# Patient Record
Sex: MALE | Race: WHITE | Employment: OTHER
[De-identification: names, ages, dates, MRNs, and addresses within clinical notes are randomized per-mention and may not be internally consistent; named-entity substitution may affect disease eponyms.]

---

## 2017-06-09 ENCOUNTER — TELEPHONE (OUTPATIENT)
Dept: RADIATION ONCOLOGY | Facility: MEDICAL CENTER | Age: 82
End: 2017-06-09

## 2017-07-21 ENCOUNTER — TELEPHONE (OUTPATIENT)
Dept: RADIATION ONCOLOGY | Facility: MEDICAL CENTER | Age: 82
End: 2017-07-21

## 2017-12-12 ENCOUNTER — OFFICE VISIT (OUTPATIENT)
Dept: INFECTIOUS DISEASES | Age: 82
End: 2017-12-12
Payer: MEDICARE

## 2017-12-12 VITALS
HEIGHT: 64 IN | TEMPERATURE: 96.7 F | DIASTOLIC BLOOD PRESSURE: 99 MMHG | WEIGHT: 150 LBS | BODY MASS INDEX: 25.61 KG/M2 | OXYGEN SATURATION: 95 % | HEART RATE: 64 BPM | SYSTOLIC BLOOD PRESSURE: 139 MMHG

## 2017-12-12 DIAGNOSIS — B38.9 COCCIDIOMYCOSIS, PROGRESSIVE: Primary | ICD-10-CM

## 2017-12-12 PROCEDURE — 1123F ACP DISCUSS/DSCN MKR DOCD: CPT | Performed by: INTERNAL MEDICINE

## 2017-12-12 PROCEDURE — G8484 FLU IMMUNIZE NO ADMIN: HCPCS | Performed by: INTERNAL MEDICINE

## 2017-12-12 PROCEDURE — 4040F PNEUMOC VAC/ADMIN/RCVD: CPT | Performed by: INTERNAL MEDICINE

## 2017-12-12 PROCEDURE — G8419 CALC BMI OUT NRM PARAM NOF/U: HCPCS | Performed by: INTERNAL MEDICINE

## 2017-12-12 PROCEDURE — 99215 OFFICE O/P EST HI 40 MIN: CPT | Performed by: INTERNAL MEDICINE

## 2017-12-12 PROCEDURE — 1036F TOBACCO NON-USER: CPT | Performed by: INTERNAL MEDICINE

## 2017-12-12 PROCEDURE — G8427 DOCREV CUR MEDS BY ELIG CLIN: HCPCS | Performed by: INTERNAL MEDICINE

## 2017-12-12 RX ORDER — MAGNESIUM OXIDE 400 MG/1
400 TABLET ORAL DAILY
COMMUNITY

## 2017-12-12 RX ORDER — FLUCONAZOLE 200 MG/1
200 TABLET ORAL DAILY
COMMUNITY
End: 2018-04-24 | Stop reason: SDUPTHER

## 2017-12-12 RX ORDER — DILTIAZEM HYDROCHLORIDE 120 MG/1
120 TABLET, FILM COATED ORAL 4 TIMES DAILY
COMMUNITY

## 2017-12-12 NOTE — LETTER
The patient will have a repeat CT scan of the chest at the [facility] which we were able to review with him and his family. CT scan is stable and does not show any changes in the masslike lesion in the right upper lobe. There are no other new areas of involvement. The patient also was concerned about his ascending aorta aneurysm. The CT scan did not show any progression of this particular lesion. The patient was reassured on both accounts and advised to simply continue his suppressive doses of Diflucan. He had a question about being able to visit his family in [place] for short periods of time. 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 The patient indicated that he would return to see me after she came back from [place] sometime in April or May 2018. Pertinent review of systems include: No fevers no chills no weight loss. No pulmonary symptoms  For complete review of symptoms see ROS section below. 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 The patient has been on treatment with Diflucan and has shown no progression of the lesions since August 2015. Currently on suppressive doses with Diflucan at 200 mg per day. We will plan to maintain same therapy and follow-up with patient in April or May 2018. He also has an ascending aortic aneurysm, which measures about 5 cm in diameter, which has remained stable. Previous visits with Dr. Sheryle Friendly at [facility] and with a cardiovascular surgeon at the [address] [place] [address],Unit [facility] have yielded the advise for non- surgical conservative treatment. Discussed with patient, family.     I have personally reviewed the past medical history, past surgical history, medications, social history, and family history, and I have  Number of children: N/A    Years of education: N/A     Occupational History    Not on file. Social History Main Topics    Smoking status: Former Smoker    Smokeless tobacco: Never Used      Comment: quit 43 years ago    Alcohol use Yes    Drug use: No    Sexual activity: Not on file     Other Topics Concern    Not on file     Social History Narrative    No narrative on file       Family History:     Family History   Problem Relation Age of Onset    Heart Disease Father     Diabetes Father         Allergies:   Review of patient's allergies indicates no known allergies. Review of Systems:   Constitutional: No fevers or chills. No systemic complaints  Head: No headaches  Eyes: No double vision or blurry vision. ENT: No sore throat or runny nose. . No hearing loss, tinnitus or vertigo. Cardiovascular: No chest pain or palpitations. No shortness of breath. No ADAIR  Lung: No shortness of breath or cough. No sputum production  Abdomen: No nausea, vomiting, diarrhea, or abdominal pain. .  Genitourinary: No increased urinary frequency, or dysuria. No hematuria. No suprapubic or CVA pain  Musculoskeletal: No muscle aches or pains. No joint effusions, swelling or deformities  Hematologic: No bleeding or bruising. Neurologic: No headache, weakness, numbness, or tingling. Physical Examination :   BP (!) 139/99 (Site: Right Arm, Position: Sitting)   Pulse 64   Temp 96.7 °F (35.9 °C)   Ht 5' 4\" (1.626 m)   Wt 150 lb (68 kg)   SpO2 95%   BMI 25.75 kg/m²     General Appearance: Awake, alert, and in no apparent distress  Head:  Normocephalic, no trauma  Eyes: Pupils equal, round, reactive, to light and accommodation; extraocular movements intact; sclera anicteric; conjunctivae pink. No embolic phenomena. ENT: Oropharynx clear, without erythema, exudate, or thrush. No tenderness of sinuses. Mouth/throat: mucosa pink and moist. No lesions. Dentition in good repair. Neck:Supple, without lymphadenopathy. Thyroid normal, No bruits. Pulmonary/Chest: Clear to auscultation, without wheezes, rales, or rhonchi. No dullness to percussion. Cardiovascular: Regular rate and rhythm without murmurs, rubs, or gallops. Abdomen: Soft, non tender. Bowel sounds normal. No organomegaly  All four Extremities: No cyanosis, clubbing, edema, or effusions. Neurologic: No gross sensory or motor deficits. Skin: Warm and dry with good turgor. Signs of peripheral arterial insufficiency. Medical Decision Making:   I have independently reviewed/ordered the following labs:  Chest CT from [facility] was reviewed with the patient and his family. CBC with Differential: No results found for: WBC, HGB, HCT, PLT, SEGSPCT, BANDSPCT, LYMPHOPCT, MONOPCT, EOSPCT  BMP:   Lab Results   Component Value Date    CREATININE 2.1 02/01/2017     Hepatic Function Panel: No results found for: PROT, LABALBU, BILIDIR, IBILI, BILITOT, ALKPHOS, ALT, AST  No results found for: RPR  No results found for: HIV  No results found for: BC  No results found for: MUCUS, PH, RBC, TRICHOMONAS, WBC, YEAST, TURBIDITY  Lab Results   Component Value Date    CREATININE 2.1 02/01/2017       Thank you for allowing us to participate in the care of this patient. Please call with questions. Renee Washington MD  Pager: [phone] - Office: [phone]          If you have questions, please do not hesitate to call me. I look forward to following Jose De Luna along with you.     Sincerely,        Renee Washington MD

## 2017-12-12 NOTE — TELEPHONE ENCOUNTER
Patient was in for an appointment today and Dr. Zulema Savage came to me and asked me to place and pend and order for this patients Diflucan 200mg one tablet once a day for 6 month.

## 2017-12-13 RX ORDER — FLUCONAZOLE 200 MG/1
200 TABLET ORAL DAILY
Qty: 30 TABLET | Refills: 5 | Status: SHIPPED | OUTPATIENT
Start: 2017-12-13 | End: 2018-01-12

## 2017-12-13 NOTE — PROGRESS NOTES
[facility] - Office Progress Note  Today's Date and Time: 12/13/2017, 3:46 PM    Diagnostic Impression :     1. Coccidiomycosis, progressive        Recommendations   · Continue Suppressive treatment with Diflucan  · Follow-up in 4 months after he returns from [place] complaint/reason for consultation:     Chief Complaint   Patient presents with    Other     coccidiodomycosis       History of Present Illness:   Pineda Epsinosa is a 80y.o.-year-old  male who was evaluated on 12/12/2017. Patient seen at the request of Larisa Velazco. Patient came into the office as a follow-up for his problems with pulmonary coccidiomycosis. He indicates he has continued to do well and has not noticed any changes in his pulmonary status. The patient denied any cough,  hemoptysis or expectoration of sputum. She denied any changes in his degree of activity. He has been taking suppressive doses of Diflucan currently at 200 mg daily. The patient has been previously treated with higher doses of Diflucan but he could not tolerate 400 mg daily on a long-term basis. Consequently we elected to treat him with 200 mg per day feeling that this was a better than nothing. The patient will have a repeat CT scan of the chest at the [facility] which we were able to review with him and his family. CT scan is stable and does not show any changes in the masslike lesion in the right upper lobe. There are no other new areas of involvement. The patient also was concerned about his ascending aorta aneurysm. The CT scan did not show any progression of this particular lesion. The patient was reassured on both accounts and advised to simply continue his suppressive doses of Diflucan. He had a question about being able to visit his family in [place] for short periods of time.   I indicated to him that this should not be a problem since he already has developed immunity against the PLACEMENT  06/2010    PROSTATE SURGERY      TONSILLECTOMY      UPPER GASTROINTESTINAL ENDOSCOPY         Medications:     Current Outpatient Prescriptions:     diltiazem (CARDIZEM) 120 MG tablet, Take 120 mg by mouth 4 times daily, Disp: , Rfl:     fluconazole (DIFLUCAN) 200 MG tablet, Take 200 mg by mouth daily, Disp: , Rfl:     magnesium oxide (MAG-OX) 400 MG tablet, Take 400 mg by mouth daily, Disp: , Rfl:     rivaroxaban (XARELTO) 20 MG TABS tablet, Take 20 mg by mouth, Disp: , Rfl:     omeprazole (PRILOSEC) 20 MG delayed release capsule, Take 20 mg by mouth daily, Disp: , Rfl:     lisinopril (PRINIVIL;ZESTRIL) 2.5 MG tablet, Take 2.5 mg by mouth daily, Disp: , Rfl:     FLUoxetine (PROZAC) 20 MG capsule, Take 20 mg by mouth daily, Disp: , Rfl:     Multiple Vitamins-Minerals (THERAPEUTIC MULTIVITAMIN-MINERALS) tablet, Take 1 tablet by mouth daily, Disp: , Rfl:     Omega-3 Fatty Acids (FISH OIL) 1000 MG CAPS, Take 3,000 mg by mouth daily, Disp: , Rfl:     fluconazole (DIFLUCAN) 200 MG tablet, Take 1 tablet by mouth daily, Disp: 30 tablet, Rfl: 5     Social History:     Social History     Social History    Marital status:      Spouse name: N/A    Number of children: N/A    Years of education: N/A     Occupational History    Not on file. Social History Main Topics    Smoking status: Former Smoker    Smokeless tobacco: Never Used      Comment: quit 43 years ago    Alcohol use Yes    Drug use: No    Sexual activity: Not on file     Other Topics Concern    Not on file     Social History Narrative    No narrative on file       Family History:     Family History   Problem Relation Age of Onset    Heart Disease Father     Diabetes Father         Allergies:   Review of patient's allergies indicates no known allergies. Review of Systems:   Constitutional: No fevers or chills. No systemic complaints  Head: No headaches  Eyes: No double vision or blurry vision.   ENT: No sore throat or Results   Component Value Date    CREATININE 2.1 02/01/2017     Hepatic Function Panel: No results found for: PROT, LABALBU, BILIDIR, IBILI, BILITOT, ALKPHOS, ALT, AST  No results found for: RPR  No results found for: HIV  No results found for: BC  No results found for: MUCUS, PH, RBC, TRICHOMONAS, WBC, YEAST, TURBIDITY  Lab Results   Component Value Date    CREATININE 2.1 02/01/2017       Thank you for allowing us to participate in the care of this patient. Please call with questions.     Guillermo Bhakta MD  Pager: [phone] - Office: [phone]

## 2018-04-24 ENCOUNTER — OFFICE VISIT (OUTPATIENT)
Dept: INFECTIOUS DISEASES | Age: 83
End: 2018-04-24
Payer: MEDICARE

## 2018-04-24 VITALS
BODY MASS INDEX: 26.19 KG/M2 | SYSTOLIC BLOOD PRESSURE: 124 MMHG | HEART RATE: 79 BPM | DIASTOLIC BLOOD PRESSURE: 89 MMHG | TEMPERATURE: 97 F | RESPIRATION RATE: 14 BRPM | WEIGHT: 153.4 LBS | HEIGHT: 64 IN | OXYGEN SATURATION: 97 %

## 2018-04-24 DIAGNOSIS — B38.9 COCCIDIOIDOMYCOSIS: Primary | ICD-10-CM

## 2018-04-24 PROCEDURE — 99215 OFFICE O/P EST HI 40 MIN: CPT | Performed by: INTERNAL MEDICINE

## 2018-04-24 PROCEDURE — G8427 DOCREV CUR MEDS BY ELIG CLIN: HCPCS | Performed by: INTERNAL MEDICINE

## 2018-04-24 PROCEDURE — 4040F PNEUMOC VAC/ADMIN/RCVD: CPT | Performed by: INTERNAL MEDICINE

## 2018-04-24 PROCEDURE — G8419 CALC BMI OUT NRM PARAM NOF/U: HCPCS | Performed by: INTERNAL MEDICINE

## 2018-04-24 PROCEDURE — 1036F TOBACCO NON-USER: CPT | Performed by: INTERNAL MEDICINE

## 2018-04-24 PROCEDURE — 1123F ACP DISCUSS/DSCN MKR DOCD: CPT | Performed by: INTERNAL MEDICINE

## 2018-04-24 RX ORDER — FLUCONAZOLE 200 MG/1
200 TABLET ORAL DAILY
COMMUNITY
End: 2019-01-01

## 2018-05-03 DIAGNOSIS — B38.9 COCCIDIOIDOMYCOSIS: ICD-10-CM

## 2018-07-24 ENCOUNTER — OFFICE VISIT (OUTPATIENT)
Dept: INFECTIOUS DISEASES | Age: 83
End: 2018-07-24
Payer: MEDICARE

## 2018-07-24 VITALS
TEMPERATURE: 98.1 F | DIASTOLIC BLOOD PRESSURE: 77 MMHG | WEIGHT: 147 LBS | HEART RATE: 60 BPM | SYSTOLIC BLOOD PRESSURE: 109 MMHG | BODY MASS INDEX: 25.1 KG/M2 | HEIGHT: 64 IN

## 2018-07-24 DIAGNOSIS — B38.9 COCCIDIOMYCOSIS, PROGRESSIVE: ICD-10-CM

## 2018-07-24 DIAGNOSIS — B38.9 COCCIDIOIDOMYCOSIS: Primary | ICD-10-CM

## 2018-07-24 DIAGNOSIS — R53.83 OTHER FATIGUE: ICD-10-CM

## 2018-07-24 PROCEDURE — G8419 CALC BMI OUT NRM PARAM NOF/U: HCPCS | Performed by: INTERNAL MEDICINE

## 2018-07-24 PROCEDURE — 1101F PT FALLS ASSESS-DOCD LE1/YR: CPT | Performed by: INTERNAL MEDICINE

## 2018-07-24 PROCEDURE — 4040F PNEUMOC VAC/ADMIN/RCVD: CPT | Performed by: INTERNAL MEDICINE

## 2018-07-24 PROCEDURE — 99215 OFFICE O/P EST HI 40 MIN: CPT | Performed by: INTERNAL MEDICINE

## 2018-07-24 PROCEDURE — 1036F TOBACCO NON-USER: CPT | Performed by: INTERNAL MEDICINE

## 2018-07-24 PROCEDURE — 1123F ACP DISCUSS/DSCN MKR DOCD: CPT | Performed by: INTERNAL MEDICINE

## 2018-07-24 PROCEDURE — G8427 DOCREV CUR MEDS BY ELIG CLIN: HCPCS | Performed by: INTERNAL MEDICINE

## 2018-07-24 RX ORDER — FLUCONAZOLE 100 MG/1
200 TABLET ORAL DAILY
Qty: 180 TABLET | Refills: 3 | Status: SHIPPED | OUTPATIENT
Start: 2018-07-24 | End: 2019-01-01

## 2018-07-24 NOTE — PROGRESS NOTES
rested but could lay down and take a nap at any time. When he sits down to watch television, he falls asleep immediately. He denies chills, fevers, SOB, sputum production and snoring. He does report that he has an occasional cough and that his voice is hoarse much of the time. On exam his lungs are clear. Heart rate is regular. DISCUSSION:  Tolerating diflucan 200 mg daily without side effects. Pt wishes to remain on the medication to prevent a relapse of the infection. Will repeat liver function tests periodically. He is complaining of chronic fatigue, not likely related to the Coccidioidomycosis. Also reports occasional cough and hoarse voice. We discussed that the hoarse voice and cough could be related to his aneurysm, or perhaps to allergies. His fatigue may be multifactorial. Will check his hemoglobin and TSH. PLAN:  Continue diflucan 200 mg daily  Monitor liver function  Check CBC and TSH  Return to office in 6 months. Discussed with patient, family. I have personally reviewed the past medical history, past surgical history, medications, social history, and family history, and I have updated the database accordingly.   Past Medical History:     Past Medical History:   Diagnosis Date    Aortic insufficiency     Arthritis     BPH (benign prostatic hyperplasia)     Chronic renal insufficiency     STAGE 4    Coccidioidomycosis     GERD (gastroesophageal reflux disease)     History of Holter monitoring 5/10, 5/99    History of stress test 5/10, 2006    persantine/dipyridamole cardiolyte    Hypertension     Sinus bradycardia     Sinusitis        Past Surgical  History:     Past Surgical History:   Procedure Laterality Date    CARDIAC CATHETERIZATION  2006    CATARACT REMOVAL Bilateral     COLONOSCOPY      PACEMAKER INSERTION  02/01/2017    REPLACEMENT / DR Edna Sims WITH NEW ATRIAL LEAD    PACEMAKER PLACEMENT  06/2010    PROSTATE SURGERY      TONSILLECTOMY      UPPER GASTROINTESTINAL ENDOSCOPY         Medications:     Current Outpatient Prescriptions:     fluconazole (DIFLUCAN) 200 MG tablet, Take 200 mg by mouth daily, Disp: , Rfl:     PROCTO-MED HC 2.5 % rectal cream, Place 1 tablet rectally daily, Disp: , Rfl:     diltiazem (CARDIZEM) 120 MG tablet, Take 120 mg by mouth 4 times daily, Disp: , Rfl:     magnesium oxide (MAG-OX) 400 MG tablet, Take 400 mg by mouth daily, Disp: , Rfl:     rivaroxaban (XARELTO) 20 MG TABS tablet, Take 20 mg by mouth, Disp: , Rfl:     omeprazole (PRILOSEC) 20 MG delayed release capsule, Take 20 mg by mouth daily, Disp: , Rfl:     lisinopril (PRINIVIL;ZESTRIL) 2.5 MG tablet, Take 2.5 mg by mouth daily, Disp: , Rfl:     FLUoxetine (PROZAC) 20 MG capsule, Take 20 mg by mouth daily, Disp: , Rfl:     Multiple Vitamins-Minerals (THERAPEUTIC MULTIVITAMIN-MINERALS) tablet, Take 1 tablet by mouth daily, Disp: , Rfl:     Omega-3 Fatty Acids (FISH OIL) 1000 MG CAPS, Take 3,000 mg by mouth daily, Disp: , Rfl:      Social History:     Social History     Social History    Marital status:      Spouse name: N/A    Number of children: N/A    Years of education: N/A     Occupational History    Not on file. Social History Main Topics    Smoking status: Former Smoker    Smokeless tobacco: Never Used      Comment: quit 43 years ago    Alcohol use Yes    Drug use: No    Sexual activity: Not on file     Other Topics Concern    Not on file     Social History Narrative    No narrative on file       Family History:     Family History   Problem Relation Age of Onset    Heart Disease Father     Diabetes Father         Allergies:   Patient has no known allergies. Review of Systems:   Constitutional: No fevers or chills. No systemic complaints  Head: No headaches  Eyes: No double vision or blurry vision. ENT: No sore throat or runny nose. . No hearing loss, tinnitus or vertigo. Cardiovascular: No chest pain or palpitations.  No shortness of

## 2018-07-24 NOTE — LETTER
[facility] [address] [address]  Phone: [phone]  Fax: [phone]    PCP: Praveen Ivan MD   CC providers:  Praveen Ivan MD  [address] Devora Pena MD  [address]  [address], [place]  [address] [facility]  [address] [facility] In [facility] [address] [facility] [address] In [facility]       July 24, 2018       Patient: Katie Crocker   MR Number: [id number]   YOB: 1933   Date of Visit: 7/24/2018     Dear Tania Cuellar: Thank you for allowing me to see your patient Mr. Katie Crocker. Below are the relevant portions of my assessment and plan of care. [facility] - Office Progress Note  Today's Date and Time: 7/24/2018, 12:55 PM    Diagnostic Impression :     1. Coccidioidomycosis        Recommendations ·   Continue Diflucan 200mg daily. No stop date. · Monitor liver function tests, periodiodically. · Check CBC and TSH    Chief complaint/reason for consultation:   Coccidioidomycosis    History of Present Illness:   INITIAL HISTORY:     Nieves Meyer a 80y.o.-year-old  male who was evaluated on 12/12/2017 in the office for follow up of pulmonary coccidiomycosis.     He was on 400 mg Diflucan and he did not tolerate and was started on 200 gm of Diflucan. His previous  CT scan was reviewed and did not show any changes in the masslike lesion in the right upper lobe.  There are no other new areas of involvement. He is is scheduled to have a new CT scan within the next several weeks.     The patient also was concerned about his ascending aorta aneurysm.  The CT scan did not show any progression of this particular lesion.  The patient was reassured on both accounts and advised to simply continue his current treatment. He was last seen on 4/24/18 and was tolerating the diflucan. At that time the length of treatment was discussed with the patient and family. The patient wants to stay on the diflucan indefinitely to prevent a relapse of the infection. We discussed the medications potential for liver toxicity, however, the patient had been taking it for more than one year without any issues. A CT Chest was completed on  5/3/18 and showed a decrease in the size of the Rt upper lobe lesion from 2.6 x 1.6 cm and is now 2.4 x 1.3 cm. His aortic aneurysm has not changed. His liver enzymes were within normal limits on 5/2/18    CURRENT EXAMIN ATION 7/24/2018     Pt reports that he feels good in general.   He states that he is tired, but that he is able to complete his normal daily routine. He reports that he wakes up rested but could lay down and take a nap at any time. When he sits down to watch television, he falls asleep immediately. He denies chills, fevers, SOB, sputum production and snoring. He does report that he has an occasional cough and that his voice is hoarse much of the time. On exam his lungs are clear. Heart rate is regular. DISCUSSION:  Tolerating diflucan 200 mg daily without side effects. Pt wishes to remain on the medication to prevent a relapse of the infection. Will repeat liver function tests periodically. He is complaining of chronic fatigue, not likely related to the Coccidioidomycosis. Also reports occasional cough and hoarse voice. We discussed that the hoarse voice and cough could be related to his aneurysm, or perhaps to allergies. His fatigue may be multifactorial. Will check his hemoglobin and TSH. PLAN:  Continue diflucan 200 mg daily  Monitor liver function  Check CBC and TSH  Return to office in 6 months. Discussed with patient, family.     I have personally reviewed the past medical history, past surgical history, medications, social history, and family history, and I have  Years of education: N/A     Occupational History    Not on file. Social History Main Topics    Smoking status: Former Smoker    Smokeless tobacco: Never Used      Comment: quit 43 years ago    Alcohol use Yes    Drug use: No    Sexual activity: Not on file     Other Topics Concern    Not on file     Social History Narrative    No narrative on file       Family History:     Family History   Problem Relation Age of Onset    Heart Disease Father     Diabetes Father         Allergies:   Patient has no known allergies. Review of Systems:   Constitutional: No fevers or chills. No systemic complaints  Head: No headaches  Eyes: No double vision or blurry vision. ENT: No sore throat or runny nose. . No hearing loss, tinnitus or vertigo. Cardiovascular: No chest pain or palpitations. No shortness of breath. No ADAIR. Hoarse voice. Lung: No shortness of breath. Occasional cough. No sputum production  Abdomen: No nausea, vomiting, diarrhea, or abdominal pain. .  Genitourinary: No increased urinary frequency, or dysuria. No hematuria. No suprapubic or CVA pain  Musculoskeletal: No muscle aches or pains. No joint effusions, swelling or deformities  Hematologic: No bleeding or bruising. Neurologic: No headache, weakness, numbness, or tingling. Physical Examination :   There were no vitals taken for this visit. General Appearance: Awake, alert, and in no apparent distress  Head:  Normocephalic, no trauma  Eyes: Pupils equal, round, reactive, to light and accommodation; extraocular movements intact; sclera anicteric; conjunctivae pink. No embolic phenomena. ENT: Oropharynx clear, without erythema, exudate, or thrush. No tenderness of sinuses. Mouth/throat: mucosa pink and moist. No lesions. Dentition in good repair. Neck:Supple, without lymphadenopathy. Thyroid normal, No bruits.   Pulmonary/Chest: Clear to auscultation, without wheezes, rales, or

## 2018-08-06 LAB
ALBUMIN SERPL-MCNC: NORMAL G/DL
ALP BLD-CCNC: NORMAL U/L
ALT SERPL-CCNC: NORMAL U/L
ANION GAP SERPL CALCULATED.3IONS-SCNC: NORMAL MMOL/L
AST SERPL-CCNC: NORMAL U/L
BILIRUB SERPL-MCNC: NORMAL MG/DL (ref 0.1–1.4)
BUN BLDV-MCNC: NORMAL MG/DL
CALCIUM SERPL-MCNC: NORMAL MG/DL
CHLORIDE BLD-SCNC: NORMAL MMOL/L
CO2: NORMAL MMOL/L
CREAT SERPL-MCNC: NORMAL MG/DL
GFR CALCULATED: NORMAL
GLUCOSE BLD-MCNC: NORMAL MG/DL
POTASSIUM SERPL-SCNC: NORMAL MMOL/L
SODIUM BLD-SCNC: NORMAL MMOL/L
TOTAL PROTEIN: NORMAL
TSH SERPL DL<=0.05 MIU/L-ACNC: NORMAL UIU/ML

## 2018-08-07 DIAGNOSIS — B38.9 COCCIDIOIDOMYCOSIS: ICD-10-CM

## 2018-08-07 DIAGNOSIS — R53.83 OTHER FATIGUE: ICD-10-CM

## 2019-01-01 ENCOUNTER — OFFICE VISIT (OUTPATIENT)
Dept: INFECTIOUS DISEASES | Age: 84
End: 2019-01-01
Payer: MEDICARE

## 2019-01-01 VITALS
BODY MASS INDEX: 23.32 KG/M2 | HEART RATE: 62 BPM | HEIGHT: 64 IN | DIASTOLIC BLOOD PRESSURE: 69 MMHG | SYSTOLIC BLOOD PRESSURE: 152 MMHG | RESPIRATION RATE: 14 BRPM | WEIGHT: 136.6 LBS

## 2019-01-01 VITALS
DIASTOLIC BLOOD PRESSURE: 74 MMHG | RESPIRATION RATE: 12 BRPM | HEART RATE: 60 BPM | SYSTOLIC BLOOD PRESSURE: 115 MMHG | WEIGHT: 135.2 LBS | OXYGEN SATURATION: 98 % | HEIGHT: 64 IN | TEMPERATURE: 97.3 F | BODY MASS INDEX: 23.08 KG/M2

## 2019-01-01 DIAGNOSIS — B38.9 COCCIDIOIDOMYCOSIS: Primary | ICD-10-CM

## 2019-01-01 DIAGNOSIS — N18.30 CKD (CHRONIC KIDNEY DISEASE), STAGE III (HCC): ICD-10-CM

## 2019-01-01 DIAGNOSIS — I72.9 ANEURYSM (HCC): ICD-10-CM

## 2019-01-01 DIAGNOSIS — I48.0 PAROXYSMAL ATRIAL FIBRILLATION (HCC): ICD-10-CM

## 2019-01-01 DIAGNOSIS — B38.9 COCCIDIOIDOMYCOSIS: ICD-10-CM

## 2019-01-01 DIAGNOSIS — R79.89 ELEVATED LFTS: Primary | ICD-10-CM

## 2019-01-01 DIAGNOSIS — R63.4 WEIGHT LOSS: ICD-10-CM

## 2019-01-01 DIAGNOSIS — R91.8 MASS OF UPPER LOBE OF RIGHT LUNG: ICD-10-CM

## 2019-01-01 DIAGNOSIS — I10 BENIGN ESSENTIAL HYPERTENSION: ICD-10-CM

## 2019-01-01 DIAGNOSIS — I71.21 ASCENDING AORTIC ANEURYSM: ICD-10-CM

## 2019-01-01 PROCEDURE — G8427 DOCREV CUR MEDS BY ELIG CLIN: HCPCS | Performed by: INTERNAL MEDICINE

## 2019-01-01 PROCEDURE — G8484 FLU IMMUNIZE NO ADMIN: HCPCS | Performed by: INTERNAL MEDICINE

## 2019-01-01 PROCEDURE — 4040F PNEUMOC VAC/ADMIN/RCVD: CPT | Performed by: INTERNAL MEDICINE

## 2019-01-01 PROCEDURE — 1123F ACP DISCUSS/DSCN MKR DOCD: CPT | Performed by: INTERNAL MEDICINE

## 2019-01-01 PROCEDURE — 1036F TOBACCO NON-USER: CPT | Performed by: INTERNAL MEDICINE

## 2019-01-01 PROCEDURE — G8420 CALC BMI NORM PARAMETERS: HCPCS | Performed by: INTERNAL MEDICINE

## 2019-01-01 PROCEDURE — 99214 OFFICE O/P EST MOD 30 MIN: CPT | Performed by: INTERNAL MEDICINE

## 2019-01-01 PROCEDURE — 99213 OFFICE O/P EST LOW 20 MIN: CPT | Performed by: INTERNAL MEDICINE

## 2019-01-01 RX ORDER — FLUCONAZOLE 100 MG/1
200 TABLET ORAL DAILY
Qty: 28 TABLET | Refills: 3 | Status: SHIPPED | OUTPATIENT
Start: 2019-01-01 | End: 2019-01-01 | Stop reason: SDUPTHER

## 2019-01-01 RX ORDER — FUROSEMIDE 20 MG/1
20 TABLET ORAL DAILY
COMMUNITY
Start: 2019-01-01

## 2019-01-01 RX ORDER — HYDROCHLOROTHIAZIDE 12.5 MG/1
12.5 CAPSULE, GELATIN COATED ORAL DAILY
COMMUNITY
Start: 2019-01-01

## 2019-01-01 RX ORDER — FLUCONAZOLE 100 MG/1
200 TABLET ORAL DAILY
Qty: 180 TABLET | Refills: 1 | Status: SHIPPED | OUTPATIENT
Start: 2019-01-01 | End: 2020-03-10

## 2019-01-01 RX ORDER — TRAZODONE HYDROCHLORIDE 50 MG/1
TABLET ORAL
COMMUNITY
Start: 2019-01-01

## 2019-01-01 RX ORDER — IRON ASPGLY,PS/C/B12/FA/CA/SUC 150-25-1
1 CAPSULE ORAL DAILY
COMMUNITY

## 2019-01-01 RX ORDER — HYDRALAZINE HYDROCHLORIDE 50 MG/1
50 TABLET, FILM COATED ORAL 2 TIMES DAILY
COMMUNITY

## 2019-01-22 ENCOUNTER — OFFICE VISIT (OUTPATIENT)
Dept: INFECTIOUS DISEASES | Age: 84
End: 2019-01-22
Payer: MEDICARE

## 2019-01-22 VITALS
DIASTOLIC BLOOD PRESSURE: 82 MMHG | RESPIRATION RATE: 14 BRPM | SYSTOLIC BLOOD PRESSURE: 129 MMHG | HEART RATE: 81 BPM | OXYGEN SATURATION: 97 % | BODY MASS INDEX: 24.41 KG/M2 | HEIGHT: 64 IN | WEIGHT: 143 LBS

## 2019-01-22 DIAGNOSIS — B38.9 COCCIDIOIDOMYCOSIS: Primary | ICD-10-CM

## 2019-01-22 PROCEDURE — G8484 FLU IMMUNIZE NO ADMIN: HCPCS | Performed by: INTERNAL MEDICINE

## 2019-01-22 PROCEDURE — G8420 CALC BMI NORM PARAMETERS: HCPCS | Performed by: INTERNAL MEDICINE

## 2019-01-22 PROCEDURE — 1123F ACP DISCUSS/DSCN MKR DOCD: CPT | Performed by: INTERNAL MEDICINE

## 2019-01-22 PROCEDURE — 1036F TOBACCO NON-USER: CPT | Performed by: INTERNAL MEDICINE

## 2019-01-22 PROCEDURE — 1101F PT FALLS ASSESS-DOCD LE1/YR: CPT | Performed by: INTERNAL MEDICINE

## 2019-01-22 PROCEDURE — 4040F PNEUMOC VAC/ADMIN/RCVD: CPT | Performed by: INTERNAL MEDICINE

## 2019-01-22 PROCEDURE — 99215 OFFICE O/P EST HI 40 MIN: CPT | Performed by: INTERNAL MEDICINE

## 2019-01-22 PROCEDURE — G8427 DOCREV CUR MEDS BY ELIG CLIN: HCPCS | Performed by: INTERNAL MEDICINE

## 2019-01-22 RX ORDER — BUTALBITAL, ACETAMINOPHEN, CAFFEINE AND CODEINE PHOSPHATE 50; 325; 40; 30 MG/1; MG/1; MG/1; MG/1
CAPSULE ORAL
COMMUNITY

## 2019-01-22 RX ORDER — AMLODIPINE BESYLATE 10 MG/1
TABLET ORAL
COMMUNITY
Start: 2019-01-12

## 2019-07-16 PROBLEM — R59.0 ANTERIOR CERVICAL LYMPHADENOPATHY: Status: ACTIVE | Noted: 2017-06-13

## 2019-07-16 PROBLEM — Z01.810 ENCOUNTER FOR PREPROCEDURAL CARDIOVASCULAR EXAMINATION: Status: ACTIVE | Noted: 2017-06-15

## 2019-07-16 PROBLEM — R91.8 MASS OF UPPER LOBE OF RIGHT LUNG: Status: ACTIVE | Noted: 2017-06-13

## 2019-07-16 PROBLEM — N39.43 URINARY INCONTINENCE, POST-VOID DRIBBLING: Status: ACTIVE | Noted: 2018-04-12

## 2019-07-16 PROBLEM — I72.9 ANEURYSM (HCC): Status: ACTIVE | Noted: 2018-11-07

## 2019-07-16 PROBLEM — I48.0 PAROXYSMAL ATRIAL FIBRILLATION (HCC): Status: ACTIVE | Noted: 2017-05-11

## 2019-07-16 PROBLEM — R97.20 RISING PSA LEVEL: Status: ACTIVE | Noted: 2018-02-01

## 2019-07-16 PROBLEM — K64.0 GRADE I HEMORRHOIDS: Status: ACTIVE | Noted: 2017-12-11

## 2019-07-16 PROBLEM — I71.21 ASCENDING AORTIC ANEURYSM (HCC): Status: ACTIVE | Noted: 2017-07-18

## 2019-07-16 PROBLEM — B38.9: Status: ACTIVE | Noted: 2017-08-08

## 2019-07-16 PROBLEM — E78.5 HYPERLIPIDEMIA: Status: ACTIVE | Noted: 2018-11-07

## 2019-07-16 PROBLEM — J93.9 PNEUMOTHORAX: Status: ACTIVE | Noted: 2018-11-09

## 2019-07-16 PROBLEM — I77.810 ASCENDING AORTA DILATATION (HCC): Status: ACTIVE | Noted: 2018-11-07

## 2019-07-16 PROBLEM — I10 HYPERTENSIVE DISORDER: Status: ACTIVE | Noted: 2018-11-07

## 2019-07-16 PROBLEM — J06.9 ACUTE UPPER RESPIRATORY INFECTION: Status: ACTIVE | Noted: 2017-05-12

## 2019-07-16 PROBLEM — N39.9 UROLOGIC DISORDER: Status: ACTIVE | Noted: 2018-02-01

## 2019-07-22 NOTE — PROGRESS NOTES
[facility] - Office Progress Note  Today's Date and Time: 7/23/2019, 3:22 PM    Diagnostic Impression :     1. Coccidioidomycosis    2. Ascending aortic aneurysm ([facility] [address] [address]    3. Benign essential hypertension    4. CKD (chronic kidney disease), stage III ([facility]    5. Weight loss        Recommendations   · Continue Diflucan 200mg daily. No stop date. · Monitor liver function today   · Follow up in 4 months  · Obtain recent CT scan from [facility] for review    Chief complaint/reason for consultation:     Chief Complaint   Patient presents with    6 Month Follow-Up     Coccidiomycosis       History of Present Illness:   INITIAL HISTORY:     Gloria Dawson a 80y.o.-year-old  male who was evaluated on 12/12/2017 in the office for follow up of pulmonary coccidiomycosis.     He was on 400 mg Diflucan and he did not tolerate and was started on 200 gm of Diflucan.    His previous  CT scan was reviewed and did not show any changes in the masslike lesion in the right upper lobe.  There are no other new areas of involvement. Carmita Mitchell is is scheduled to have a new CT scan within the next several weeks.     The patient also was concerned about his ascending aorta aneurysm.  The CT scan did not show any progression of this particular lesion.  The patient was reassured on both accounts and advised to simply continue his current treatment.     He was last seen on 4/24/18 and was tolerating the diflucan. At that time the length of treatment was discussed with the patient and family. The patient wants to stay on the diflucan indefinitely to prevent a relapse of the infection. We discussed the medications potential for liver toxicity, however, the patient had been taking it for more than one year without any issues.      A CT Chest was completed on  5/3/18 and showed a decrease in the size of the Rt upper lobe lesion from 2.6 x 1.6 cm and is now 2.4 x 1.3 cm.  His aortic aneurysm has not 7/26/2006    Sinusitis     Urinary incontinence, post-void dribbling 4/12/2018    Overview:  ==== 4/12/2018  ==== New problem, additional workup planned. Almost certainly post urinary dribbling. Will have the patient try to use tissue paper after urinating to see if we can Benmercy Dugan up anything access urine in the urethra. Will reassess in 3 months. If continued questions or concerns may try Pyridium just isolate the urine as the source may also consider cystoscopy if situation     Urologic disorder 2/1/2018    Overview:  Has thoracic Aoritic dilation with embolis. Pt chose non-operative intervention       Past Surgical  History:     Past Surgical History:   Procedure Laterality Date    CARDIAC CATHETERIZATION  2006    CATARACT REMOVAL Bilateral     COLONOSCOPY      PACEMAKER INSERTION  02/01/2017    REPLACEMENT / DR Sadaf White WITH NEW ATRIAL LEAD    PACEMAKER PLACEMENT  06/2010    PROSTATE SURGERY      TONSILLECTOMY      UPPER GASTROINTESTINAL ENDOSCOPY         Medications:     Current Outpatient Medications:     iron polysaccaride (FERREX 150 FORTE PLUS)  MG CAPS, Take 1 capsule by mouth daily, Disp: , Rfl:     hydrochlorothiazide (MICROZIDE) 12.5 MG capsule, Take 12.5 mg by mouth daily, Disp: , Rfl:     butalbital-acetaminophen-caffeine-codeine (FIORICET WITH CODEINE) -17-30 MG per capsule, butalbital 50 mg-acetaminophen 325 mg-caffeine 40 mg-codeine 30 mg cap  Take 1 capsule every 6 hours by oral route as needed. , Disp: , Rfl:     fluconazole (DIFLUCAN) 100 MG tablet, Take 2 tablets by mouth daily, Disp: 180 tablet, Rfl: 3    PROCTO-MED HC 2.5 % rectal cream, Place 1 tablet rectally daily, Disp: , Rfl:     diltiazem (CARDIZEM) 120 MG tablet, Take 120 mg by mouth 4 times daily, Disp: , Rfl:     magnesium oxide (MAG-OX) 400 MG tablet, Take 400 mg by mouth daily, Disp: , Rfl:     rivaroxaban (XARELTO) 20 MG TABS tablet, Take 20 mg by mouth, Disp: , Rfl:     omeprazole (PRILOSEC) 20 MG

## 2019-07-23 NOTE — LETTER
[facility] [address]  Phone: [phone]  Fax: [phone]    PCP: Yolis Keith MD   CC providers:  Yolis Keith MD  [address] [facility]       July 23, 2019       Patient: Jeri Obrien   MR Number: [id number]   YOB: 1933   Date of Visit: 7/23/2019     Dear Irvin Mcgowan: Thank you for allowing me to see your patient Mr. Jeri Obrien. Below are the relevant portions of my assessment and plan of care. [facility] - Office Progress Note  Today's Date and Time: 7/23/2019, 3:22 PM    Diagnostic Impression :     1. Coccidioidomycosis    2. Ascending aortic aneurysm ([address]    3. Benign essential hypertension    4. CKD (chronic kidney disease), stage III ([address]    5. Weight loss        Recommendations ·   Continue Diflucan 200mg daily. No stop date. · Monitor liver function today   · Follow up in 4 months  · Obtain recent CT scan from [facility] for review    Chief complaint/reason for consultation:     Chief Complaint   Patient presents with    6 Month Follow-Up     Coccidiomycosis       History of Present Illness:   INITIAL HISTORY:     Kj Tom a 80y.o.-year-old  male who was evaluated on 12/12/2017 in the office for follow up of pulmonary coccidiomycosis.        He was on 400 mg Diflucan and he did not tolerate and was started on 200 gm of Diflucan.    His previous  CT scan was reviewed and did not show any changes in the masslike lesion in the right upper lobe.  There are no other new areas of involvement.  He is is scheduled to have a new CT scan within the next several weeks.     The patient also was concerned about his ascending aorta aneurysm.  The CT scan did not show any progression of this particular lesion.  The patient was reassured on both accounts and advised to simply continue his current Tolerating diflucan 200 mg daily without side effects. Pt wishes to remain on the medication to prevent a relapse of the infection. Will repeat liver function tests today    PLAN:  · Continue Diflucan 200mg daily. No stop date. · Monitor liver function today  · Follow up in 4 months      Discussed with patient, family. Patient seen face to face for a period of 30 min, of which more than 50% of the time was spent on counseling, explanation of diagnosis, planning of further management, and answering all questions  . I have personally reviewed the past medical history, past surgical history, medications, social history, and family history, and I have updated the database accordingly. Past Medical History:     Past Medical History:   Diagnosis Date    Aneurysm ([facility] 11/7/2018    Anterior cervical lymphadenopathy 6/13/2017    Aortic insufficiency     Aortic valve disorder 7/6/2006    Arthritis     Ascending aorta dilatation (HCC) 11/7/2018    Ascending aortic aneurysm (HCC) 7/18/2017    Benign essential hypertension 7/6/2006    BPH (benign prostatic hyperplasia)     Chronic renal insufficiency     STAGE 4    Coccidioidomycosis     Encounter for preprocedural cardiovascular examination 6/15/2017    Gastroesophageal reflux disease without esophagitis 10/4/2016    GERD (gastroesophageal reflux disease)     Grade I hemorrhoids 12/11/2017    History of Holter monitoring 5/10, 5/99    History of stress test 5/10, 2006    persantine/dipyridamole cardiolyte    Hyperlipidemia 11/7/2018    Hypertension     Hypertensive disorder 11/7/2018    Mass of upper lobe of right lung 6/13/2017    Paroxysmal atrial fibrillation ([facility] 5/11/2017    Pneumothorax 11/9/2018    Presence of cardiac pacemaker 7/26/2010    Prostate cancer screening 12/9/2016    +++++++++  12/9/2016 ALLSCRIPTS SUMMARY +++++++++++  PSAs since 2010 have been mid to. Patient wishes continue prostate cancer screening.   Very fit -38-30 MG per capsule, butalbital 50 mg-acetaminophen 325 mg-caffeine 40 mg-codeine 30 mg cap  Take 1 capsule every 6 hours by oral route as needed. , Disp: , Rfl:     fluconazole (DIFLUCAN) 100 MG tablet, Take 2 tablets by mouth daily, Disp: 180 tablet, Rfl: 3    PROCTO-MED HC 2.5 % rectal cream, Place 1 tablet rectally daily, Disp: , Rfl:     diltiazem (CARDIZEM) 120 MG tablet, Take 120 mg by mouth 4 times daily, Disp: , Rfl:     magnesium oxide (MAG-OX) 400 MG tablet, Take 400 mg by mouth daily, Disp: , Rfl:     rivaroxaban (XARELTO) 20 MG TABS tablet, Take 20 mg by mouth, Disp: , Rfl:     omeprazole (PRILOSEC) 20 MG delayed release capsule, Take 20 mg by mouth daily, Disp: , Rfl:     FLUoxetine (PROZAC) 20 MG capsule, Take 20 mg by mouth daily, Disp: , Rfl:     Multiple Vitamins-Minerals (THERAPEUTIC MULTIVITAMIN-MINERALS) tablet, Take 1 tablet by mouth daily, Disp: , Rfl:     Omega-3 Fatty Acids (FISH OIL) 1000 MG CAPS, Take 3,000 mg by mouth daily, Disp: , Rfl:     hydrALAZINE (APRESOLINE) 50 MG tablet, Take 50 mg by mouth 2 times daily, Disp: , Rfl:     furosemide (LASIX) 20 MG tablet, Take 20 mg by mouth daily, Disp: , Rfl:     traZODone (DESYREL) 50 MG tablet, , Disp: , Rfl:     amLODIPine (NORVASC) 10 MG tablet, , Disp: , Rfl:     lisinopril (PRINIVIL;ZESTRIL) 2.5 MG tablet, Take 2.5 mg by mouth daily, Disp: , Rfl:      Social History:     Social History     Socioeconomic History    Marital status:      Spouse name: Not on file    Number of children: Not on file    Years of education: Not on file    Highest education level: Not on file   Occupational History    Not on file   Social Needs    Financial resource strain: Not on file    Food insecurity:     Worry: Not on file     Inability: Not on file    Transportation needs:     Medical: Not on file     Non-medical: Not on file   Tobacco Use    Smoking status: Former Smoker    Smokeless tobacco: Never Used (1.626 m)   Wt 135 lb 3.2 oz (61.3 kg)   SpO2 98% Comment: Room air at rest  BMI 23.21 kg/m²     General Appearance: Awake, alert, and in no apparent distress  Head:  Normocephalic, no trauma  Eyes: Pupils equal, round, reactive, to light and accommodation; extraocular movements intact; sclera anicteric; conjunctivae pink. No embolic phenomena. ENT: Oropharynx clear, without erythema, exudate, or thrush. No tenderness of sinuses. Mouth/throat: mucosa pink and moist. No lesions. Dentition in good repair. Neck:Supple, without lymphadenopathy. Thyroid normal, No bruits. Pulmonary/Chest: Clear to auscultation, without wheezes, rales, or rhonchi. No dullness to percussion. Cardiovascular: Regular rate and rhythm without murmurs, rubs, or gallops. Abdomen: Soft, non tender. Bowel sounds normal. No organomegaly  All four Extremities: No cyanosis, clubbing, edema, or effusions. Neurologic: No gross sensory or motor deficits. Walking with a cane. Family reports some instability. Skin: Warm and dry with good turgor. No signs of peripheral arterial or venous insufficiency. Medical Decision Making:   I have independently reviewed/ordered the following labs:    CBC with Differential: No results found for: WBC, HGB, HCT, PLT, SEGSPCT, BANDSPCT, LYMPHOPCT, MONOPCT, EOSPCT  BMP:   Lab Results   Component Value Date    CREATININE 2.1 02/01/2017     Hepatic Function Panel: No results found for: PROT, LABALBU, BILIDIR, IBILI, BILITOT, ALKPHOS, ALT, AST  No results found for: RPR  No results found for: HIV  No results found for: BC  No results found for: MUCUS, PH, RBC, TRICHOMONAS, WBC, YEAST, TURBIDITY  Lab Results   Component Value Date    CREATININE 2.1 02/01/2017       Thank you for allowing us to participate in the care of this patient. Please call with questions. Gulshan Waters MD  Pager: [phone] - Office: [phone]        If you have questions, please do not hesitate to call me.  I look forward

## 2019-08-15 PROBLEM — Z01.810 ENCOUNTER FOR PREPROCEDURAL CARDIOVASCULAR EXAMINATION: Status: RESOLVED | Noted: 2017-06-15 | Resolved: 2019-01-01

## 2020-01-01 ENCOUNTER — HOSPITAL ENCOUNTER (EMERGENCY)
Age: 85
End: 2020-01-28
Attending: EMERGENCY MEDICINE
Payer: MEDICARE

## 2020-01-01 PROCEDURE — 2500000003 HC RX 250 WO HCPCS: Performed by: EMERGENCY MEDICINE

## 2020-01-01 PROCEDURE — 99284 EMERGENCY DEPT VISIT MOD MDM: CPT

## 2020-01-01 PROCEDURE — 6360000002 HC RX W HCPCS: Performed by: EMERGENCY MEDICINE

## 2020-01-01 PROCEDURE — 92950 HEART/LUNG RESUSCITATION CPR: CPT

## 2020-01-01 RX ORDER — EPINEPHRINE 1 MG/ML
INJECTION, SOLUTION, CONCENTRATE INTRAVENOUS DAILY PRN
Status: COMPLETED | OUTPATIENT
Start: 2020-01-01 | End: 2020-01-01

## 2020-01-01 RX ORDER — CALCIUM CHLORIDE 100 MG/ML
INJECTION INTRAVENOUS; INTRAVENTRICULAR DAILY PRN
Status: COMPLETED | OUTPATIENT
Start: 2020-01-01 | End: 2020-01-01

## 2020-01-01 RX ADMIN — EPINEPHRINE 0.1 MG: 1 INJECTION, SOLUTION INTRAMUSCULAR; SUBCUTANEOUS at 15:14

## 2020-01-01 RX ADMIN — EPINEPHRINE 0.1 MG: 1 INJECTION, SOLUTION INTRAMUSCULAR; SUBCUTANEOUS at 15:02

## 2020-01-01 RX ADMIN — SODIUM BICARBONATE 50 MEQ: 84 INJECTION, SOLUTION INTRAVENOUS at 14:59

## 2020-01-01 RX ADMIN — EPINEPHRINE 0.1 MG: 1 INJECTION, SOLUTION INTRAMUSCULAR; SUBCUTANEOUS at 15:11

## 2020-01-01 RX ADMIN — EPINEPHRINE 0.1 MG: 1 INJECTION, SOLUTION INTRAMUSCULAR; SUBCUTANEOUS at 15:05

## 2020-01-01 RX ADMIN — SODIUM BICARBONATE 50 MEQ: 84 INJECTION, SOLUTION INTRAVENOUS at 15:00

## 2020-01-01 RX ADMIN — EPINEPHRINE 0.1 MG: 1 INJECTION, SOLUTION INTRAMUSCULAR; SUBCUTANEOUS at 15:08

## 2020-01-01 RX ADMIN — CALCIUM CHLORIDE 1 G: 100 INJECTION, SOLUTION INTRAVENOUS; INTRAVENTRICULAR at 15:01

## 2020-01-28 NOTE — ED PROVIDER NOTES
EMERGENCY DEPARTMENT ENCOUNTER    Pt Name: Sukumar Alfaro  MRN: [id number]  [place] 5/15/1933  Date of evaluation: 1/28/20  CHIEF COMPLAINT       Chief Complaint   Patient presents with    Cardiac Arrest     HISTORY OF PRESENT ILLNESS   This is an 51-year-old male that presents the emergency department in cardiac arrest.  History was obtained from family and from EMS. The patient was at an event [facility] the [facility], he subsequently collapsed to the floor, there was bystander CPR. Patient had a temporary airway established, ACLS protocols were initiated and the patient was transferred to the hospital.          REVIEW OF SYSTEMS     Review of Systems   Unable to perform ROS: Patient unresponsive     PASTMEDICAL HISTORY     Past Medical History:   Diagnosis Date    Aneurysm ([address] 11/7/2018    Anterior cervical lymphadenopathy 6/13/2017    Aortic insufficiency     Aortic valve disorder 7/6/2006    Arthritis     Ascending aorta dilatation ([address] 11/7/2018    Ascending aortic aneurysm ([address] 7/18/2017    Benign essential hypertension 7/6/2006    BPH (benign prostatic hyperplasia)     Chronic renal insufficiency     STAGE 4    Coccidioidomycosis     Encounter for preprocedural cardiovascular examination 6/15/2017    Gastroesophageal reflux disease without esophagitis 10/4/2016    GERD (gastroesophageal reflux disease)     Grade I hemorrhoids 12/11/2017    History of Holter monitoring 5/10, 5/99    History of stress test 5/10, 2006    persantine/dipyridamole cardiolyte    Hyperlipidemia 11/7/2018    Hypertension     Hypertensive disorder 11/7/2018    Mass of upper lobe of right lung 6/13/2017    Paroxysmal atrial fibrillation ([address] 5/11/2017    Pneumothorax 11/9/2018    Presence of cardiac pacemaker 7/26/2010    Prostate cancer screening 12/9/2016    +++++++++  12/9/2016 ALLSCRIPTS SUMMARY +++++++++++  PSAs since 2010 have been mid to. Patient wishes continue prostate cancer screening.   Very times daily    FLUCONAZOLE (DIFLUCAN) 100 MG TABLET    Take 2 tablets by mouth daily    FLUOXETINE (PROZAC) 20 MG CAPSULE    Take 20 mg by mouth daily    FUROSEMIDE (LASIX) 20 MG TABLET    Take 20 mg by mouth daily    HYDRALAZINE (APRESOLINE) 50 MG TABLET    Take 50 mg by mouth 2 times daily    HYDROCHLOROTHIAZIDE (MICROZIDE) 12.5 MG CAPSULE    Take 12.5 mg by mouth daily    IRON POLYSACCARIDE (FERREX 150 FORTE PLUS)  MG CAPS    Take 1 capsule by mouth daily    LISINOPRIL (PRINIVIL;ZESTRIL) 2.5 MG TABLET    Take 2.5 mg by mouth daily    MAGNESIUM OXIDE (MAG-OX) 400 MG TABLET    Take 400 mg by mouth daily    MULTIPLE VITAMINS-MINERALS (THERAPEUTIC MULTIVITAMIN-MINERALS) TABLET    Take 1 tablet by mouth daily    OMEGA-3 FATTY ACIDS (FISH OIL) 1000 MG CAPS    Take 3,000 mg by mouth daily    OMEPRAZOLE (PRILOSEC) 20 MG DELAYED RELEASE CAPSULE    Take 20 mg by mouth daily    PROCTO-MED HC 2.5 % RECTAL CREAM    Place 1 tablet rectally daily    RIVAROXABAN (XARELTO) 20 MG TABS TABLET    Take 20 mg by mouth    TRAZODONE (DESYREL) 50 MG TABLET         ALLERGIES     has No Known Allergies. FAMILY HISTORY     He indicated that the status of his father is unknown. SOCIAL HISTORY       Social History     Tobacco Use    Smoking status: Former Smoker    Smokeless tobacco: Never Used    Tobacco comment: quit 43 years ago   Substance Use Topics    Alcohol use: Yes    Drug use: No     PHYSICAL EXAM     INITIAL VITALS: BP (!) 150/125   Pulse 119    Physical Exam  Constitutional:       Appearance: He is ill-appearing and toxic-appearing. HENT:      Head: Normocephalic and atraumatic. Pulmonary:      Comments: LMA airway device in place. Patient is easy to bag, he has bilateral chest expansion, lungs are rhonchorous. Chest:      Chest wall: Deformity and swelling present. Skin:     Coloration: Skin is cyanotic and mottled.          MEDICAL DECISION MAKIN-year-old male presented in cardiac arrest.  After

## 2020-01-28 NOTE — FLOWSHEET NOTE
Writer to Emergency Department for full arrest. Per EMS, staff, and family, patient and his family were attending a  when the patient collapsed. 911 called, and patient transported by squad to [facility] ED. Family gathers including wife Shadia Lamar, daughters, sons-in-law, etc. Also present is the family's rabbi. Support offered. Dr Tomasa Knott updates family. Wife to bedside as efforts continue. TOD per staff: [phone]. Multiple family members present inside room and in harrison. Ongoing support offered. Body transferred to ICU 1104. Family to bedside. Family is processing patient's sudden death. They are tearful, but grieving appropriately and supporting one another. Family expresses thanks for the efforts of staff and for the support they have received. [facility] and [facility] notified by staff. Both release patient. Family chooses [facility]. Writer contacts  home and takes completed Release of Body Form to Emergency Department Registration desk. 20 [id number]   Encounter Summary   Services provided to: Family   Referral/Consult From: Multi-disciplinary team   Support System Spouse; Family members; [employer] community   Contact [employer] Completed   Continue Visiting   (20)   Complexity of Encounter High   Length of Encounter 2 hours   Spiritual Assessment Completed Yes   Routine   Type Initial   Crisis   Type ED Alert   Grief and Life Adjustment   Type Death;Grief and loss   Assessment Tearful;Grieving; Shock   Intervention Active listening;Explored feelings, thoughts, concerns; End of life care;Grief care   Outcome Venting emotion;Grieving;Tearful;Engaged in conversation; Shared life review;Coping;Expressed feelings/needs/concerns;Comfort;Expressed gratitude

## 2020-01-28 NOTE — ED NOTES
Pt arrives via Life quad 6 for cardiac arrest.  It was a witnessed arrest at [facility]  home at 1410, cpr by family. EMS brought pt with Daniella Wu on him, [facility] airway in place. He went into v-fib with one shock with no response. PEA with 3 doses of epi, one amp of bicarb PTA.      Lissette Pérez RN  [id number]